# Patient Record
Sex: MALE | Race: WHITE | NOT HISPANIC OR LATINO | URBAN - METROPOLITAN AREA
[De-identification: names, ages, dates, MRNs, and addresses within clinical notes are randomized per-mention and may not be internally consistent; named-entity substitution may affect disease eponyms.]

---

## 2017-12-13 ENCOUNTER — ALLSCRIPTS OFFICE VISIT (OUTPATIENT)
Dept: OTHER | Facility: OTHER | Age: 27
End: 2017-12-13

## 2017-12-15 NOTE — PROGRESS NOTES
Assessment  1  Sinusitis, acute (461 9) (J01 90)   2  Conjunctivitis, left eye (372 30) (H10 9)    Plan  Conjunctivitis, left eye    · Cefdinir 300 MG Oral Capsule; TAKE 1 CAPSULE EVERY 12 HOURS UNTILGONE   · Ciprofloxacin HCl - 0 3 % Ophthalmic Solution; INSTILL 2 DROPS IN THEAFFECTED EYE 3 TIMES A DAY   · Follow Up if Not Better Evaluation and Treatment  Follow-up  Status: Complete  Done:[id number] 03:25PM    Chief Complaint  Patients presents today for acute visit  He states he has a pink L eye and discharge is coming out of it today  He states he also has mucus in his throat, nasal congestion, coughing, sneezing and sore throat for two weeks  No other concerns  History of Present Illness  HPI: Patient is here with conjunctival redness and discharge from left eye starting today  Patient has had nasal congestion sore throat and rhinorrhea over the past 2 weeks  Patient did use Mucinex  No fever noted  Review of Systems   Constitutional: no fever or chills, feels well, no tiredness, no recent weight loss or weight gain  ENT: as noted in HPI  Cardiovascular: no complaints of slow or fast heart rate, no chest pain, no palpitations, no leg claudication or lower extremity edema  Respiratory: as noted in HPI  Gastrointestinal: no complaints of abdominal pain, no constipation, no nausea or vomiting, no diarrhea or bloody stools  Genitourinary: no complaints of dysuria or incontinence, no hesitancy, no nocturia, no genital lesion, no inadequacy of penile erection  Musculoskeletal: no complaints of arthralgia, no myalgia, no joint swelling or stiffness, no limb pain or swelling  Integumentary: no complaints of skin rash or lesion, no itching or dry skin, no skin wounds  Neurological: no complaints of headache, no confusion, no numbness or tingling, no dizziness or fainting  Active Problems  1   Scrotal sebaceous cyst (706 2) (L72 3)    Past Medical History  Active Problems And Past Medical History Reviewed: The active problems and past medical history were reviewed and updated today  Social History   · Denied: History of Alcohol use   · Denied: History of Drinks coffee   · Never a smoker   · No drug use   · No guns in the home    Surgical History  1  History of Knee Arthroscopy With Lateral Meniscus Repair    Current Meds   1  No Reported Medications Recorded    Allergies  1  No Known Drug Allergies    Vitals   Recorded: [id number] 03:09PM   Temperature 98 2 F, Tympanic   Systolic 916, RUE, Sitting   Diastolic 92, RUE, Sitting   Height 5 ft 9 in   Weight 232 lb    BMI Calculated 34 26   BSA Calculated 2 2     Physical Exam   Constitutional  General appearance: No acute distress, well appearing and well nourished  Eyes  Conjunctiva and lids: Abnormal  -- Left eye red and with discharge noted  Pupils and irises: Equal, round and reactive to light  Ears, Nose, Mouth, and Throat  External inspection of ears and nose: Normal    Otoscopic examination: Tympanic membrance translucent with normal light reflex  Canals patent without erythema  Nasal mucosa, septum, and turbinates: Normal without edema or erythema  Oropharynx: Abnormal  -- pnd  Pulmonary  Respiratory effort: No increased work of breathing or signs of respiratory distress  Auscultation of lungs: Clear to auscultation, equal breath sounds bilaterally, no wheezes, no rales, no rhonci  Cardiovascular  Palpation of heart: Normal PMI, no thrills  Auscultation of heart: Normal rate and rhythm, normal S1 and S2, without murmurs  Examination of extremities for edema and/or varicosities: Normal    Carotid pulses: Normal    Abdomen  Abdomen: Non-tender, no masses  Liver and spleen: No hepatomegaly or splenomegaly  Lymphatic  Palpation of lymph nodes in neck: No lymphadenopathy  Musculoskeletal  Gait and station: Normal    Digits and nails: Normal without clubbing or cyanosis     Inspection/palpation of joints, bones, and muscles: Normal    Skin  Skin and subcutaneous tissue: Normal without rashes or lesions  Neurologic  Cranial nerves: Cranial nerves 2-12 intact  Reflexes: 2+ and symmetric  Sensation: No sensory loss     Psychiatric  Orientation to person, place and time: Normal    Mood and affect: Normal          Signatures   Electronically signed by : Kuldip Almeida DO; Dec 13 2017  3:26PM EST                       (Author)

## 2018-01-23 VITALS
TEMPERATURE: 98.2 F | WEIGHT: 232 LBS | BODY MASS INDEX: 34.36 KG/M2 | DIASTOLIC BLOOD PRESSURE: 92 MMHG | SYSTOLIC BLOOD PRESSURE: 130 MMHG | HEIGHT: 69 IN